# Patient Record
Sex: MALE | Race: WHITE | NOT HISPANIC OR LATINO | Employment: PART TIME | URBAN - METROPOLITAN AREA
[De-identification: names, ages, dates, MRNs, and addresses within clinical notes are randomized per-mention and may not be internally consistent; named-entity substitution may affect disease eponyms.]

---

## 2018-06-26 ENCOUNTER — RECORDS - HEALTHEAST (OUTPATIENT)
Dept: GENERAL RADIOLOGY | Facility: CLINIC | Age: 28
End: 2018-06-26

## 2018-06-26 ENCOUNTER — OFFICE VISIT - HEALTHEAST (OUTPATIENT)
Dept: FAMILY MEDICINE | Facility: CLINIC | Age: 28
End: 2018-06-26

## 2018-06-26 ENCOUNTER — COMMUNICATION - HEALTHEAST (OUTPATIENT)
Dept: FAMILY MEDICINE | Facility: CLINIC | Age: 28
End: 2018-06-26

## 2018-06-26 ENCOUNTER — COMMUNICATION - HEALTHEAST (OUTPATIENT)
Dept: SCHEDULING | Facility: CLINIC | Age: 28
End: 2018-06-26

## 2018-06-26 DIAGNOSIS — M25.562 LEFT KNEE PAIN, UNSPECIFIED CHRONICITY: ICD-10-CM

## 2018-06-26 DIAGNOSIS — Z80.0 FAMILY HISTORY OF COLON CANCER: ICD-10-CM

## 2018-06-26 DIAGNOSIS — E66.01 OBESITY, CLASS III, BMI 40-49.9 (MORBID OBESITY) (H): ICD-10-CM

## 2018-06-26 DIAGNOSIS — M25.562 PAIN IN LEFT KNEE: ICD-10-CM

## 2018-06-26 ASSESSMENT — MIFFLIN-ST. JEOR: SCORE: 2512.08

## 2021-06-01 VITALS — HEIGHT: 72 IN | WEIGHT: 315 LBS | BODY MASS INDEX: 42.66 KG/M2

## 2021-06-18 NOTE — PROGRESS NOTES
Assessment / Impression     1. Left knee pain, unspecified chronicity  XR Knee Left Plus [place] VW    Ambulatory referral to PT/OT   2. Obesity, Class III, BMI 40-49.9 (morbid obesity) (H)     3. Family history of colon cancer             Plan:     Left knee pain: I reviewed today's x-ray results with patient.  Patient does have some mild lateral joint line tenderness, otherwise exam was essentially normal.  For now, would recommend physical therapy, referral was ordered, and we did discuss treatment with ice, elevation, and NSAIDs.  Patient states that ibuprofen has not been helpful, therefore I did give him prescription strength naproxen 500 mg twice daily.  Advised patient to use medication with food.    Obesity class III: Also discussed with patient that some of his knee symptoms may be related to weight.  Briefly discussed lifestyle changes, increasing physical activity.    Follow-up in 1-2 months if symptoms persist despite treatment plan outlined, consider MRI at that time.    Family history of colon cancer: Patient most recently had colonoscopy done in 2016, biopsy showed benign hyperplastic polyps, was recommended that he repeat colonoscopy in 3-5 years, given his personal and family history.    She has also not had a general physical in several years, encourage patient to schedule physical appointment in the next month.    Subjective:      HPI: Ish Dietz is a 28 y.o. male, new to [employer], who presents for left knee pain.  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  Pain started around 2pm 2 days ago.  Doesn't recall any injury with this new onset pain, though states he fell in winter, slipped on ice about 15-16 years ago and injured left knee.  Feels a little better now, when resting, pain is better, worse when walking.  He also notes the pain is worse if he turns his leg.  There is no locking of the knee.  No fevers.   Pain is on lateral and anterior part of knee.  No knee surgeries.  Took aspirin, which didn't do much for pain.        Medical History:     Patient Active Problem List   Diagnosis     Depression     Tobacco abuse     Obesity, Class III, BMI 40-49.9 (morbid obesity) (H)     Family history of colon cancer       History reviewed. No pertinent past medical history.    Past Surgical History:   Procedure Laterality Date     WISDOM TOOTH EXTRACTION         Current Medications:     Current Outpatient Prescriptions   Medication Sig     aspirin 325 MG tablet Take 325 mg by mouth daily.     naproxen (NAPROSYN) 500 MG tablet Take 1 tablet (500 mg total) by mouth 2 (two) times a day with meals.       Family History:     Family History   Problem Relation Age of Onset     Colon cancer Sister 32     Multiple sclerosis Maternal Aunt        Review of Systems  All other systems reviewed and are negative.         Social History:     History   Smoking Status     Current Every Day Smoker     Types: Cigarettes   Smokeless Tobacco     Never Used     Social History     Social History Narrative     No narrative on file         Objective:     /78 (Patient Site: Left Arm, Patient Position: Sitting, Cuff Size: Adult Large)  Pulse 82  Resp 18  Ht 6' (1.829 m)  Wt (!) 337 lb 1.6 oz (152.9 kg)  BMI 45.72 kg/m2  Physical Examination: General appearance - alert, well appearing, and in no distress  Eyes: pupils equal and reactive, extraocular eye movements intact  Mouth: mucous membranes moist, pharynx normal without lesions  Neck: supple, no significant adenopathy or thyromegaly  Lungs: clear to auscultation, no wheezes, rales or rhonchi, symmetric air entry  Heart: normal rate, regular rhythm, normal S1, S2, no murmurs.  Abdomen: soft, nontender, nondistended, no masses or organomegaly  Neurological: alert, oriented, normal speech, no focal findings or movement disorder noted.    Musculoskeletal: normal gait.  Right lower extremity: No  swelling or skin color changes of the knee.  Full range of motion with flexion and extension of knee joint.  No joint line tenderness.  Ligaments appear intact.  Left lower extremity: There is no swelling, effusion, or skin color changes of the knee.  The knee is not warm to touch.  He does have some lateral joint line tenderness.  Negative Edwin's.  Normal anterior drawer test.  Ligaments appear intact.  Extremities: No edema, no clubbing or cyanosis  Psychiatric: Normal affect. Does not appear anxious or depressed.    Left knee x-ray: by my interpretation, no acute fracture or dislocation.      Radha Ruggiero MD  6/26/2018  1:47 PM

## 2021-08-10 ENCOUNTER — NURSE TRIAGE (OUTPATIENT)
Dept: NURSING | Facility: CLINIC | Age: 31
End: 2021-08-10

## 2021-08-10 ENCOUNTER — OFFICE VISIT (OUTPATIENT)
Dept: PEDIATRICS | Facility: CLINIC | Age: 31
End: 2021-08-10

## 2021-08-10 VITALS
DIASTOLIC BLOOD PRESSURE: 86 MMHG | WEIGHT: 315 LBS | BODY MASS INDEX: 44.76 KG/M2 | SYSTOLIC BLOOD PRESSURE: 137 MMHG | HEART RATE: 83 BPM | OXYGEN SATURATION: 95 %

## 2021-08-10 DIAGNOSIS — K62.5 RECTAL BLEEDING: Primary | ICD-10-CM

## 2021-08-10 DIAGNOSIS — E66.01 MORBID OBESITY (H): ICD-10-CM

## 2021-08-10 PROBLEM — Z80.0 FAMILY HISTORY OF COLON CANCER: Status: ACTIVE | Noted: 2018-06-26

## 2021-08-10 PROBLEM — D12.6 ADENOMATOUS POLYP OF COLON: Status: ACTIVE | Noted: 2021-01-19

## 2021-08-10 PROCEDURE — 99203 OFFICE O/P NEW LOW 30 MIN: CPT | Performed by: INTERNAL MEDICINE

## 2021-08-10 ASSESSMENT — PATIENT HEALTH QUESTIONNAIRE - PHQ9
SUM OF ALL RESPONSES TO PHQ QUESTIONS 1-9: 10
SUM OF ALL RESPONSES TO PHQ QUESTIONS 1-9: 10
10. IF YOU CHECKED OFF ANY PROBLEMS, HOW DIFFICULT HAVE THESE PROBLEMS MADE IT FOR YOU TO DO YOUR WORK, TAKE CARE OF THINGS AT HOME, OR GET ALONG WITH OTHER PEOPLE: SOMEWHAT DIFFICULT
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
## 2021-08-11 ASSESSMENT — PATIENT HEALTH QUESTIONNAIRE - PHQ9: SUM OF ALL RESPONSES TO PHQ QUESTIONS 1-9: 10

## 2021-08-23 ENCOUNTER — TELEPHONE (OUTPATIENT)
Dept: SURGERY | Facility: CLINIC | Age: 31
End: 2021-08-23

## 2021-08-23 NOTE — TELEPHONE ENCOUNTER
[facility] Call Center    Phone Message    May a detailed message be left on voicemail: yes     Reason for Call: Other: Per pt has referral for Rectal Bleeding referred by Lyla Hopkins MD. [facility] in [place] [facility] [employer] [place].      Action Taken: Message routed to:  [facility] Colon and rec    Travel Screening: Not Applicable

## 2021-10-07 ENCOUNTER — PRE VISIT (OUTPATIENT)
Dept: SURGERY | Facility: CLINIC | Age: 31
End: 2021-10-07

## 2021-10-07 NOTE — TELEPHONE ENCOUNTER
RECORDS RECEIVED FROM: Internal    Appt date: 10/27/2021   NOTES STATUS DETAILS   OFFICE NOTE from referring provider  Internal 08/10/2021 -- Lyla Hopkins MD in [facility]   OFFICE NOTE from other specialist   N/A    DISCHARGE SUMMARY from hospital  N/A    DISCHARGE REPORT from the ER N/A    OPERATIVE REPORT  N/A    MEDICATION LIST Internal    LABS     PFC TESTING N/A    ANAL PAP N/A    BIOPSIES/PATHOLOGY RELATED TO DIAGNOSIS Care Everywhere 01/08/2021 -- Colon, ascending, Sigmoid -- Tameka Gilbert MD    DIAGNOSTIC PROCEDURES     COLONOSCOPY N/A    UPPER ENDOSCOPY (EGD) N/A    FLEX SIGMOIDOSCOPY  N/A    ERCP N/A    IMAGING (DISC & REPORT)      CT  N/A    MRI N/A    XRAY N/A    ULTRASOUND (ENDOANAL/ENDORECTAL) N/A       Pt sleeping soundly.

## 2021-10-26 NOTE — PROGRESS NOTES
"Colon and Rectal Surgery Consult Clinic Note    Referring provider:  Lyla Hopkins MD  [address] [place] [address]       RE: Ish Dietz  : 1990  ZOEY: 10/27/2021      Ish Dietz is a very pleasant 31 year old male who presents today for rectal bleeding.    HPI:  Ish was seen by his PCP in August with rectal bleeding and a painful lump on his anus. He states that this has been an issue on and off for about a year. He was having sharp pain and some bright red blood with wiping after bowel movements. He also noticed some purulent drainage. He states that at one point it seemed to burst with a lot of drainage. He is now having some continued bleeding and discomfort but pain is not as severe. He does have some intermittent constipation and diarrhea.  Current smoker.  Sister had colon cancer at age 30 and maternal second cousin with colon cancer at age 30.  Paternal side of his family has brain tumors that he says are related to \"penta contamination from a treatment plant\". Colonoscopy in 2021 with recurrent polyps. He reports that his sister had genetic testing and this was negative.    PLEASE SEE NOTE BELOW FOR PHYSICAL EXAMINATION, REVIEW OF SYSTEMS, AND OTHER HISTORY.    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 We discussed management of this with fistulotomy as this appears fairly superficial. Some purulent drainage on exam today. Discussed the importance of managing constipation and advised a daily fiber supplement and laxative as needed. Asked him to notify the clinic in the meantime with any worsening symptoms. Patient's questions were answered to his stated satisfaction and he is in agreement with this plan.    20 minutes spent on the date of the encounter doing chart review, history and exam, documentation and further activities as noted above.     Mag Bradshaw MD  Colon and Rectal Surgery Staff  [facility]      -------------------------------------------------------------------------------------------------------------------          Medical history:  No past medical history on file.    Surgical history:  Past Surgical History:   Procedure Laterality Date     C EACH ADD TOOTH EXTRACTION       WISDOM TOOTH EXTRACTION         Problem list:    Patient Active Problem List    Diagnosis Date Noted     Morbid obesity (H) 08/10/2021     Priority: Medium     Adenomatous polyp of colon 01/19/2021     Priority: Medium     Family history of colon cancer 06/26/2018     Priority: Medium     Formatting of this note might be different from the original.  2016: Colonoscopy showed benign hyperplastic polyps; plan to repeat colonoscopy in 3-5 years based on extensive family history.       Chronic low back pain 03/04/2016     Priority: Medium     Tobacco use 03/04/2016     Priority: Medium     Depression 03/19/2015     Priority: Medium     Obesity 03/19/2015     Priority: Medium     Tobacco abuse 03/19/2015     Priority: Medium       Medications:  Current Outpatient Medications   Medication Sig Dispense Refill     ASPIRIN PO Take 81 mg by mouth as needed for moderate pain       DiphenhydrAMINE HCl (BENADRYL PO) Take 50 mg by mouth nightly as needed         Allergies:  No Known Allergies    Family history:  Family History   Problem Relation Age of Onset     Arthritis Paternal Grandmother      Cerebrovascular Disease Paternal Grandmother         early 60's     Melanoma Maternal Grandfather      Thyroid Disease Mother         hyperthyroid     Thyroid Disease Maternal Grandmother         hyperthyroid     Colon Cancer Sister 32.00     Multiple Sclerosis Maternal Aunt        Social history:  Social History     Socioeconomic History     Marital status: Single     Spouse name: Not on file     Number of children: Not on file     Years of education: Not on file     Highest education level: Not on file   Occupational  History     Not on file   Tobacco Use     Smoking status: Current Every Day Smoker     Packs/day: 1.50     Years: 10.00     Pack years: 15.00     Types: Cigarettes, Cigarettes     Smokeless tobacco: Never Used   Substance and Sexual Activity     Alcohol use: Yes     Comment: Alcoholic Drinks/day: binge drink Friday nights 6-10drinks/week     Drug use: Yes     Types: Marijuana     Comment: Drug use: daily use     Sexual activity: Yes     Partners: Female     Birth control/protection: Condom   Other Topics Concern     Not on file   Social History Narrative    Works at [employer] [place]     Social Determinants of Health     Financial Resource Strain:      Difficulty of Paying Living Expenses:    Food Insecurity:      Worried About Running Out of Food in the Last Year:      Ran Out of Food in the Last Year:    Transportation Needs:      Lack of Transportation (Medical):      Lack of Transportation (Non-Medical):    Physical Activity:      Days of Exercise per Week:      Minutes of Exercise per Session:    Stress:      Feeling of Stress :    Social Connections:      Frequency of Communication with Friends and Family:      Frequency of Social Gatherings with Friends and Family:      Attends [employer] Services:      Active Member of Clubs or Organizations:      Attends Club or Organization Meetings:      Marital Status:    Intimate Partner Violence:      Fear of Current or Ex-Partner:      Emotionally Abused:      Physically Abused:      Sexually Abused:          Nursing Notes:   Zuleyka Garcia  10/27/2021  4:19 PM  Signed  Chief Complaint   Patient presents with     Rectal Bleeding       Vitals:    10/27/21 1617   BP: (!) 147/94   BP Location: Left arm   Patient Position: Sitting   Cuff Size: Adult Large   Pulse: 96   SpO2: 95%   Weight: 325 lb 6.4 oz   Height: 6'       Body mass index is 44.13 kg/m .    Zuleyka Garcia CMA         Physical Examination:  BP (!) 147/94 (BP Location: Left arm, Patient Position:  Sitting, Cuff Size: Adult Large)   Pulse 96   Ht 6'   Wt 325 lb 6.4 oz   SpO2 95%   BMI 44.13 kg/m    General: alert, oriented, in no acute distress, sitting comfortalby  HEENT: mucous membranes moist  Perianal external examination:  Small ulceration in the posterior midline just inside the anal verge and a small external fistula opening just outside the anal verge in the posterior position with a drop of purulent drainage present.  Digital rectal examination: Was deferred.    Anoscopy: Was deferred.

## 2021-10-27 ENCOUNTER — OFFICE VISIT (OUTPATIENT)
Dept: SURGERY | Facility: CLINIC | Age: 31
End: 2021-10-27
Attending: INTERNAL MEDICINE
Payer: COMMERCIAL

## 2021-10-27 VITALS
OXYGEN SATURATION: 95 % | SYSTOLIC BLOOD PRESSURE: 147 MMHG | DIASTOLIC BLOOD PRESSURE: 94 MMHG | WEIGHT: 315 LBS | HEART RATE: 96 BPM | BODY MASS INDEX: 42.66 KG/M2 | HEIGHT: 72 IN

## 2021-10-27 DIAGNOSIS — K62.5 RECTAL BLEEDING: ICD-10-CM

## 2021-10-27 DIAGNOSIS — K60.30 ANAL FISTULA: Primary | ICD-10-CM

## 2021-10-27 PROCEDURE — 99202 OFFICE O/P NEW SF 15 MIN: CPT | Performed by: COLON & RECTAL SURGERY

## 2021-10-27 ASSESSMENT — MIFFLIN-ST. JEOR: SCORE: 2459

## 2021-10-27 ASSESSMENT — PAIN SCALES - GENERAL: PAINLEVEL: NO PAIN (0)

## 2021-10-27 NOTE — NURSING NOTE
Chief Complaint   Patient presents with     Rectal Bleeding       Vitals:    10/27/21 1617   BP: (!) 147/94   BP Location: Left arm   Patient Position: Sitting   Cuff Size: Adult Large   Pulse: 96   SpO2: 95%   Weight: 325 lb 6.4 oz   Height: 6'       Body mass index is 44.13 kg/m .    Zuleyka Garcia CMA

## 2021-10-27 NOTE — PATIENT INSTRUCTIONS
Follow up:    1. Please call Samantha to set up your surgery     2. Appointments you will need:   -pre op physical   -COVID test     3. You will need to do 2 fleet enemas for your prep. We will mail you the instructions on how to do this.     RAVI Saunders [phone]    Clinic Fax Number [phone]    Surgery Scheduling [phone]    My Chart is available 24 hours a day and is a secure way to access your records and communicate with your care team.  I strongly recommend signing up if you haven't already done so, if you are comfortable with computers.  If you would like to inquire about this or are having problems with My Chart access, you may call [phone] or go online at [email or web address].  Please allow at least 24 hours for a response and extra time on weekends and Holidays.

## 2021-10-27 NOTE — LETTER
"10/27/2021       RE: Ish Dietz  [address] [place] [address]     Dear Colleague,    Thank you for referring your patient, Ish Dietz, to the [facility] COLON AND RECTAL SURGERY CLINIC [place] [facility]. Please see a copy of my visit note below.    Colon and Rectal Surgery Consult Clinic Note    Referring provider:  Lyla Hopkins MD  [address] [place] [address]       RE: Ish Dietz  : 1990  ZOEY: 10/27/2021      Ish Dietz is a very pleasant 31 year old male who presents today for rectal bleeding.    HPI:  Ish was seen by his PCP in August with rectal bleeding and a painful lump on his anus. He states that this has been an issue on and off for about a year. He was having sharp pain and some bright red blood with wiping after bowel movements. He also noticed some purulent drainage. He states that at one point it seemed to burst with a lot of drainage. He is now having some continued bleeding and discomfort but pain is not as severe. He does have some intermittent constipation and diarrhea.  Current smoker.  Sister had colon cancer at age 30 and maternal second cousin with colon cancer at age 30.  Paternal side of his family has brain tumors that he says are related to \"penta contamination from a treatment plant\". Colonoscopy in 2021 with recurrent polyps. He reports that his sister had genetic testing and this was negative.    PLEASE SEE NOTE BELOW FOR PHYSICAL EXAMINATION, REVIEW OF SYSTEMS, AND OTHER HISTORY.    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 We discussed management of this with fistulotomy as this appears fairly superficial. Some purulent drainage on exam today. Discussed the importance of managing constipation and advised a daily fiber supplement and laxative as needed. Asked him to notify the clinic in the meantime with any worsening " symptoms. Patient's questions were answered to his stated satisfaction and he is in agreement with this plan.    20 minutes spent on the date of the encounter doing chart review, history and exam, documentation and further activities as noted above.     Mag Bradshaw MD  Colon and Rectal Surgery Staff  [facility]      -------------------------------------------------------------------------------------------------------------------          Medical history:  No past medical history on file.    Surgical history:  Past Surgical History:   Procedure Laterality Date     C EACH ADD TOOTH EXTRACTION       WISDOM TOOTH EXTRACTION         Problem list:    Patient Active Problem List    Diagnosis Date Noted     Morbid obesity (H) 08/10/2021     Priority: Medium     Adenomatous polyp of colon 01/19/2021     Priority: Medium     Family history of colon cancer 06/26/2018     Priority: Medium     Formatting of this note might be different from the original.  2016: Colonoscopy showed benign hyperplastic polyps; plan to repeat colonoscopy in 3-5 years based on extensive family history.       Chronic low back pain 03/04/2016     Priority: Medium     Tobacco use 03/04/2016     Priority: Medium     Depression 03/19/2015     Priority: Medium     Obesity 03/19/2015     Priority: Medium     Tobacco abuse 03/19/2015     Priority: Medium       Medications:  Current Outpatient Medications   Medication Sig Dispense Refill     ASPIRIN PO Take 81 mg by mouth as needed for moderate pain       DiphenhydrAMINE HCl (BENADRYL PO) Take 50 mg by mouth nightly as needed         Allergies:  No Known Allergies    Family history:  Family History   Problem Relation Age of Onset     Arthritis Paternal Grandmother      Cerebrovascular Disease Paternal Grandmother         early 60's     Melanoma Maternal Grandfather      Thyroid Disease Mother         hyperthyroid     Thyroid Disease Maternal Grandmother          hyperthyroid     Colon Cancer Sister 32.00     Multiple Sclerosis Maternal Aunt        Social history:  Social History     Socioeconomic History     Marital status: Single     Spouse name: Not on file     Number of children: Not on file     Years of education: Not on file     Highest education level: Not on file   Occupational History     Not on file   Tobacco Use     Smoking status: Current Every Day Smoker     Packs/day: 1.50     Years: 10.00     Pack years: 15.00     Types: Cigarettes, Cigarettes     Smokeless tobacco: Never Used   Substance and Sexual Activity     Alcohol use: Yes     Comment: Alcoholic Drinks/day: binge drink Friday nights 6-10drinks/week     Drug use: Yes     Types: Marijuana     Comment: Drug use: daily use     Sexual activity: Yes     Partners: Female     Birth control/protection: Condom   Other Topics Concern     Not on file   Social History Narrative    Works at "[employer] center     Social Determinants of Health     Financial Resource Strain:      Difficulty of Paying Living Expenses:    Food Insecurity:      Worried About Running Out of Food in the Last Year:      Ran Out of Food in the Last Year:    Transportation Needs:      Lack of Transportation (Medical):      Lack of Transportation (Non-Medical):    Physical Activity:      Days of Exercise per Week:      Minutes of Exercise per Session:    Stress:      Feeling of Stress :    Social Connections:      Frequency of Communication with Friends and Family:      Frequency of Social Gatherings with Friends and Family:      Attends Jain Services:      Active Member of Clubs or Organizations:      Attends Club or Organization Meetings:      Marital Status:    Intimate Partner Violence:      Fear of Current or Ex-Partner:      Emotionally Abused:      Physically Abused:      Sexually Abused:          Nursing Notes:   Zuleyka Garcia  10/27/2021  4:19 PM  Signed  Chief Complaint   Patient presents with     Rectal Bleeding        Vitals:    10/27/21 1617   BP: (!) 147/94   BP Location: Left arm   Patient Position: Sitting   Cuff Size: Adult Large   Pulse: 96   SpO2: 95%   Weight: 325 lb 6.4 oz   Height: 6'       Body mass index is 44.13 kg/m .    Zuleyka Garcia CMA         Physical Examination:  BP (!) 147/94 (BP Location: Left arm, Patient Position: Sitting, Cuff Size: Adult Large)   Pulse 96   Ht 1.829 m (6')   Wt 147.6 kg (325 lb 6.4 oz)   SpO2 95%   BMI 44.13 kg/m    General: alert, oriented, in no acute distress, sitting comfortalby  HEENT: mucous membranes moist  Perianal external examination:  Small ulceration in the posterior midline just inside the anal verge and a small external fistula opening just outside the anal verge in the posterior position with a drop of purulent drainage present.  Digital rectal examination: Was deferred.    Anoscopy: Was deferred.              Again, thank you for allowing me to participate in the care of your patient.      Sincerely,    Mag Bradshaw MD

## 2021-10-28 ENCOUNTER — TELEPHONE (OUTPATIENT)
Dept: SURGERY | Facility: CLINIC | Age: 31
End: 2021-10-28

## 2021-10-28 NOTE — TELEPHONE ENCOUNTER
Tier 2 Case Request received to schedule:    Patient Name: Ish Dietz   MRN: [id number]   Case#: [id number]   Surgeon(s) and Role:      * Mag Bradshaw MD - Primary   Date requested: * No dates entered *   Location: [facility] OR   Procedure(s):   EXAM UNDER ANESTHESIA, ANUS, possible fistulotomy, possible seton placement (N/A)   FISTULOTOMY, RECTUM (N/A)   PLACEMENT, SETON STITCH (N/A)     Additional Instructions for the Case  Multi-surgeon case:  no  Time in minutes:  45  Anesthesia: MAC  Prep: 2 fleets  Pre-Op: PAC  Labs: no  WOC: no  Special equipment: no  Special Instructions: no    Schedule with Stephanie Lagos NP 1-2 weeks after surgery.  Schedule with Surgeon 4-5 weeks after Stephanie Lagos NP    Patient is scheduled for surgery with Dr. Mag Bradshaw    Spoke with: Ish    Date of Surgery: 12/3/2021    Location: [facility]    Informed patient they will need an adult  Yes    Pre op with Provider Dr. Bradshaw on 10/27/2021    H&P: Scheduled with PAC on 11/29 at 11:45 AM    Pre-procedure COVID-19 Test: 11/29/2021 at 1:00 PM    Post-operative appointments:    - Stephanie Lagos NP: 12/10/2021 at 11:00 AM    - Dr. Bradshaw: TBD once 2022 clinic schedule is released    Surgery packet: will mail    Samantha Albarran  Cindi-op Coordinator  [place]  Direct Phone: [phone]

## 2021-10-29 ENCOUNTER — TELEPHONE (OUTPATIENT)
Dept: SURGERY | Facility: CLINIC | Age: 31
End: 2021-10-29

## 2021-10-29 NOTE — TELEPHONE ENCOUNTER
FUTURE VISIT INFORMATION      SURGERY INFORMATION:    Date: 12/3/2021    Location:[facility] OR    Surgeon:  Mag Bradshaw MD    Anesthesia Type:  Monitor Anesthesia Care    Procedure: EXAM UNDER ANESTHESIA, ANUS, possible fistulotomy, possible seton placement    Consult: 10/27/2021    RECORDS REQUESTED FROM:       Primary Care Provider: Rolando Barker MD  ([facility])

## 2021-10-29 NOTE — TELEPHONE ENCOUNTER
Correction to previous note: Case is to be scheduled at [place], so it is wait-listed to be added.    Tier 2 Case Request received to schedule:     Patient Name: Ish Dietz   MRN: [id number]   Case#: [id number]   Surgeon(s) and Role:      * Mga Bradshaw MD - Primary   Date requested: * No dates entered *   Location: [facility] OR   Procedure(s):   EXAM UNDER ANESTHESIA, ANUS, possible fistulotomy, possible seton placement (N/A)   FISTULOTOMY, RECTUM (N/A)   PLACEMENT, SETON STITCH (N/A)      Additional Instructions for the Case  Multi-surgeon case:  no  Time in minutes:  45  Anesthesia: MAC  Prep: 2 fleets  Pre-Op: PAC  Labs: no  WOC: no  Special equipment: no  Special Instructions: no     Schedule with Stephanie Lagos NP 1-2 weeks after surgery.  Schedule with Surgeon 4-5 weeks after Stephanie Lagos NP     Patient is on the wait list to be scheduled for surgery with Dr. Mag Bradshaw     Spoke with: Ish Patelative Date of Surgery: 12/3/2021     Location: [place] OR     Informed patient they will need an adult  Yes     Pre op with Provider Dr. Bradshaw on 10/27/2021     H&P: Scheduled with PAC on 11/29 at 11:45 AM     Pre-procedure COVID-19 Test: 11/29/2021 at 1:00 PM     Post-operative appointments:     - Stephanie Lagos NP: 12/10/2021 at 11:00 AM     - Dr. Bradshaw: TBD once 2022 clinic schedule is released     Surgery packet: will mail     Samantha Albarran  Cindi-op Coordinator  [place]  Direct Phone: [phone]

## 2021-11-06 NOTE — PROGRESS NOTES
"Colon and Rectal Surgery Consult Clinic Note    Referring provider:  Lyla Hopkins MD  [address] [place] [address]       RE: Ish Dietz  : 1990  ZOEY: 10/27/2021      Ish Dietz is a very pleasant 31 year old male who presents today for rectal bleeding.    HPI:  Ish was seen by his PCP in August with rectal bleeding and a painful lump on his anus. He states that this has been an issue on and off for about a year. He was having sharp pain and some bright red blood with wiping after bowel movements. He also noticed some purulent drainage. He states that at one point it seemed to burst with a lot of drainage. He is now having some continued bleeding and discomfort but pain is not as severe. He does have some intermittent constipation and diarrhea.  Current smoker.  Sister had colon cancer at age 30 and maternal second cousin with colon cancer at age 30.  Paternal side of his family has brain tumors that he says are related to \"penta contamination from a treatment plant\". Colonoscopy in 2021 with recurrent polyps. He reports that his sister had genetic testing and this was negative.    PLEASE SEE NOTE BELOW FOR PHYSICAL EXAMINATION, REVIEW OF SYSTEMS, AND OTHER HISTORY.    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 We discussed management of this with fistulotomy as this appears fairly superficial. Some purulent drainage on exam today. Discussed the importance of managing constipation and advised a daily fiber supplement and laxative as needed. Asked him to notify the clinic in the meantime with any worsening symptoms. Patient's questions were answered to his stated satisfaction and he is in agreement with this plan.    20 minutes spent on the date of the encounter doing chart review, history and exam, documentation and further activities as noted above.     Mag Bradshaw MD  Colon and Rectal Surgery Staff  [facility]" [facility]      -------------------------------------------------------------------------------------------------------------------          Medical history:  No past medical history on file.    Surgical history:  Past Surgical History:   Procedure Laterality Date     C EACH ADD TOOTH EXTRACTION       WISDOM TOOTH EXTRACTION         Problem list:    Patient Active Problem List    Diagnosis Date Noted     Morbid obesity (H) 08/10/2021     Priority: Medium     Adenomatous polyp of colon 01/19/2021     Priority: Medium     Family history of colon cancer 06/26/2018     Priority: Medium     Formatting of this note might be different from the original.  2016: Colonoscopy showed benign hyperplastic polyps; plan to repeat colonoscopy in 3-5 years based on extensive family history.       Chronic low back pain 03/04/2016     Priority: Medium     Tobacco use 03/04/2016     Priority: Medium     Depression 03/19/2015     Priority: Medium     Obesity 03/19/2015     Priority: Medium     Tobacco abuse 03/19/2015     Priority: Medium       Medications:  Current Outpatient Medications   Medication Sig Dispense Refill     ASPIRIN PO Take 81 mg by mouth as needed for moderate pain       DiphenhydrAMINE HCl (BENADRYL PO) Take 50 mg by mouth nightly as needed         Allergies:  No Known Allergies    Family history:  Family History   Problem Relation Age of Onset     Arthritis Paternal Grandmother      Cerebrovascular Disease Paternal Grandmother         early 60's     Melanoma Maternal Grandfather      Thyroid Disease Mother         hyperthyroid     Thyroid Disease Maternal Grandmother         hyperthyroid     Colon Cancer Sister 32.00     Multiple Sclerosis Maternal Aunt        Social history:  Social History     Socioeconomic History     Marital status: Single     Spouse name: Not on file     Number of children: Not on file     Years of education: Not on file     Highest education level: Not on file   Occupational  History     Not on file   Tobacco Use     Smoking status: Current Every Day Smoker     Packs/day: 1.50     Years: 10.00     Pack years: 15.00     Types: Cigarettes, Cigarettes     Smokeless tobacco: Never Used   Substance and Sexual Activity     Alcohol use: Yes     Comment: Alcoholic Drinks/day: binge drink Friday nights 6-10drinks/week     Drug use: Yes     Types: Marijuana     Comment: Drug use: daily use     Sexual activity: Yes     Partners: Female     Birth control/protection: Condom   Other Topics Concern     Not on file   Social History Narrative    Works at [employer]     Social Determinants of Health     Financial Resource Strain:      Difficulty of Paying Living Expenses:    Food Insecurity:      Worried About Running Out of Food in the Last Year:      Ran Out of Food in the Last Year:    Transportation Needs:      Lack of Transportation (Medical):      Lack of Transportation (Non-Medical):    Physical Activity:      Days of Exercise per Week:      Minutes of Exercise per Session:    Stress:      Feeling of Stress :    Social Connections:      Frequency of Communication with Friends and Family:      Frequency of Social Gatherings with Friends and Family:      Attends Pentecostal Services:      Active Member of Clubs or Organizations:      Attends Club or Organization Meetings:      Marital Status:    Intimate Partner Violence:      Fear of Current or Ex-Partner:      Emotionally Abused:      Physically Abused:      Sexually Abused:          Nursing Notes:   Zuleyka Garcia  10/27/2021  4:19 PM  Signed  Chief Complaint   Patient presents with     Rectal Bleeding       Vitals:    10/27/21 1617   BP: (!) 147/94   BP Location: Left arm   Patient Position: Sitting   Cuff Size: Adult Large   Pulse: 96   SpO2: 95%   Weight: 325 lb 6.4 oz   Height: 6'       Body mass index is 44.13 kg/m .    Zuleyka Garcia CMA         Physical Examination:  BP (!) 147/94 (BP Location: Left arm, Patient Position:  Sitting, Cuff Size: Adult Large)   Pulse 96   Ht 1.829 m (6')   Wt 147.6 kg (325 lb 6.4 oz)   SpO2 95%   BMI 44.13 kg/m    General: alert, oriented, in no acute distress, sitting comfortalby  HEENT: mucous membranes moist  Perianal external examination:  Small ulceration in the posterior midline just inside the anal verge and a small external fistula opening just outside the anal verge in the posterior position with a drop of purulent drainage present.  Digital rectal examination: Was deferred.    Anoscopy: Was deferred.

## 2021-11-23 DIAGNOSIS — Z11.59 ENCOUNTER FOR SCREENING FOR OTHER VIRAL DISEASES: ICD-10-CM

## 2021-11-29 ENCOUNTER — OFFICE VISIT (OUTPATIENT)
Dept: SURGERY | Facility: CLINIC | Age: 31
End: 2021-11-29
Payer: COMMERCIAL

## 2021-11-29 ENCOUNTER — TELEPHONE (OUTPATIENT)
Dept: SURGERY | Facility: CLINIC | Age: 31
End: 2021-11-29

## 2021-11-29 ENCOUNTER — LAB (OUTPATIENT)
Dept: LAB | Facility: CLINIC | Age: 31
End: 2021-11-29
Attending: COLON & RECTAL SURGERY

## 2021-11-29 ENCOUNTER — PRE VISIT (OUTPATIENT)
Dept: SURGERY | Facility: CLINIC | Age: 31
End: 2021-11-29

## 2021-11-29 ENCOUNTER — ANESTHESIA EVENT (OUTPATIENT)
Dept: SURGERY | Facility: CLINIC | Age: 31
End: 2021-11-29
Payer: COMMERCIAL

## 2021-11-29 ENCOUNTER — PATIENT OUTREACH (OUTPATIENT)
Dept: SURGERY | Facility: CLINIC | Age: 31
End: 2021-11-29

## 2021-11-29 ENCOUNTER — LAB (OUTPATIENT)
Dept: LAB | Facility: CLINIC | Age: 31
End: 2021-11-29
Payer: COMMERCIAL

## 2021-11-29 VITALS
BODY MASS INDEX: 42.66 KG/M2 | SYSTOLIC BLOOD PRESSURE: 141 MMHG | RESPIRATION RATE: 20 BRPM | HEIGHT: 72 IN | OXYGEN SATURATION: 96 % | HEART RATE: 88 BPM | DIASTOLIC BLOOD PRESSURE: 85 MMHG | TEMPERATURE: 97.5 F | WEIGHT: 315 LBS

## 2021-11-29 DIAGNOSIS — K60.30 ANAL FISTULA: Primary | ICD-10-CM

## 2021-11-29 DIAGNOSIS — Z01.818 PRE-OP EVALUATION: Primary | ICD-10-CM

## 2021-11-29 DIAGNOSIS — Z11.59 ENCOUNTER FOR SCREENING FOR OTHER VIRAL DISEASES: ICD-10-CM

## 2021-11-29 DIAGNOSIS — Z01.818 PRE-OP EVALUATION: ICD-10-CM

## 2021-11-29 LAB
CREAT SERPL-MCNC: 0.7 MG/DL (ref 0.66–1.25)
ERYTHROCYTE [DISTWIDTH] IN BLOOD BY AUTOMATED COUNT: 13.2 % (ref 10–15)
GFR SERPL CREATININE-BSD FRML MDRD: >90 ML/MIN/1.73M2
HCT VFR BLD AUTO: 48.2 % (ref 40–53)
HGB BLD-MCNC: 15.9 G/DL (ref 13.3–17.7)
MCH RBC QN AUTO: 28.2 PG (ref 26.5–33)
MCHC RBC AUTO-ENTMCNC: 33 G/DL (ref 31.5–36.5)
MCV RBC AUTO: 86 FL (ref 78–100)
PLATELET # BLD AUTO: 234 10E3/UL (ref 150–450)
POTASSIUM BLD-SCNC: 4.2 MMOL/L (ref 3.4–5.3)
RBC # BLD AUTO: 5.64 10E6/UL (ref 4.4–5.9)
WBC # BLD AUTO: 8.9 10E3/UL (ref 4–11)

## 2021-11-29 PROCEDURE — 36415 COLL VENOUS BLD VENIPUNCTURE: CPT | Performed by: PATHOLOGY

## 2021-11-29 PROCEDURE — 85027 COMPLETE CBC AUTOMATED: CPT | Performed by: PATHOLOGY

## 2021-11-29 PROCEDURE — 84132 ASSAY OF SERUM POTASSIUM: CPT | Performed by: PATHOLOGY

## 2021-11-29 PROCEDURE — 82565 ASSAY OF CREATININE: CPT | Performed by: PATHOLOGY

## 2021-11-29 PROCEDURE — 99203 OFFICE O/P NEW LOW 30 MIN: CPT | Performed by: PHYSICIAN ASSISTANT

## 2021-11-29 PROCEDURE — U0005 INFEC AGEN DETEC AMPLI PROBE: HCPCS | Performed by: COLON & RECTAL SURGERY

## 2021-11-29 RX ORDER — FLUTICASONE PROPIONATE 50 MCG
1 SPRAY, SUSPENSION (ML) NASAL
COMMUNITY

## 2021-11-29 ASSESSMENT — ENCOUNTER SYMPTOMS: SEIZURES: 1

## 2021-11-29 ASSESSMENT — MIFFLIN-ST. JEOR: SCORE: 2491.21

## 2021-11-29 ASSESSMENT — PAIN SCALES - GENERAL: PAINLEVEL: MODERATE PAIN (5)

## 2021-11-29 ASSESSMENT — LIFESTYLE VARIABLES: TOBACCO_USE: 1

## 2021-11-29 NOTE — H&P
Pre-Operative H & P     CC:  Preoperative exam to assess for increased cardiopulmonary risk while undergoing surgery and anesthesia.    Date of Encounter: 11/29/2021  Primary Care Physician:  No Ref-Primary, Physician     Reason for visit:   Encounter Diagnosis   Name Primary?     Pre-op evaluation Yes       HPI  Ish Dietz is a 31 year old male who presents for pre-operative H & P in preparation for EUA anus, possible fistulotomy rectum, possible seton placement with Dr. Bradshaw on 12/3/21 at [facility]. Patient is being evaluated for comorbid conditions of Current tobacco use, obesity, depression, chronic back pain      Mr. Dietz has a history of rectal bleeding with a painful lump on his anus. He states he has been noticing symptoms on and off for the past year. He was having sharp pain and BRBPR with wiping after bowel movements. He also noticed some purulent drainage. He was seen by Dr. Jimmie Roy for further evaluation and is now scheduled for the above procedure.      History is obtained from the patient and chart review    Hx of abnormal bleeding or anti-platelet use: ASA 81, has been holding for >1 week    Menstrual history: No LMP for male patient.    Prior to Admission Medications  Current Outpatient Medications   Medication Sig Dispense Refill     ASPIRIN PO Take 81 mg by mouth as needed for moderate pain       fluticasone (FLONASE) 50 MCG/ACT nasal spray Spray 1 spray into both nostrils nightly as needed for rhinitis or allergies       DiphenhydrAMINE HCl (BENADRYL PO) Take 50 mg by mouth nightly as needed (Patient not taking: Reported on 11/29/2021)         Family History  Family History   Problem Relation Age of Onset     Thyroid Disease Mother         hyperthyroid     Colon Cancer Sister 32     Thyroid Disease Maternal Grandmother         hyperthyroid     Melanoma Maternal Grandfather      Arthritis Paternal Grandmother      Cerebrovascular  Disease Paternal Grandmother         early 60's     Multiple Sclerosis Maternal Aunt      Anesthesia Reaction No family hx of      Deep Vein Thrombosis (DVT) No family hx of        The complete review of systems is negative other than noted in the HPI or here.       Anesthesia Pre-Procedure Evaluation    Patient: Ish Dietz   MRN: [id number] : 1990        Preoperative Diagnosis: Anal fistula [K60.3]    Procedure : Procedure(s):  EXAM UNDER ANESTHESIA, ANUS  possible FISTULOTOMY, RECTUM  possible PLACEMENT, SETON STITCH  PAC EVALUATION       No past medical history on file.   Past Surgical History:   Procedure Laterality Date     C EACH ADD TOOTH EXTRACTION       WISDOM TOOTH EXTRACTION        No Known Allergies   Social History     Tobacco Use     Smoking status: Current Every Day Smoker     Packs/day: 1.50     Years: 10.00     Pack years: 15.00     Types: Cigarettes, Cigarettes     Smokeless tobacco: Never Used   Substance Use Topics     Alcohol use: Yes     Comment: Alcoholic Drinks/day: binge drink Friday nights 6-10drinks/week      Wt Readings from Last 1 Encounters:   10/27/21 147.6 kg (325 lb 6.4 oz)        Anesthesia Evaluation            ROS/MED HX  ENT/Pulmonary:     (+) tobacco use, Current use,     Neurologic:  - neg neurologic ROS     Cardiovascular:     (+) -----Taking blood thinners     METS/Exercise Tolerance:     Hematologic:  - neg hematologic  ROS  (-) history of blood transfusion   Musculoskeletal: Comment: Chronic back pain        GI/Hepatic:  - neg GI/hepatic ROS  (-) GERD   Renal/Genitourinary:  - neg Renal ROS     Endo:     (+) Obesity (BMI 44),     Psychiatric/Substance Use:     (+) psychiatric history depression     Infectious Disease:  - neg infectious disease ROS     Malignancy:  - neg malignancy ROS     Other:               OUTSIDE LABS:  CBC:   Lab Results   Component Value Date    WBC 9.8 2015    HGB 16.0 2015    HCT 46.6 2015     2015     BMP:    Lab Results   Component Value Date     02/27/2015    POTASSIUM 3.6 02/27/2015    CHLORIDE 105 02/27/2015    CO2 26 02/27/2015    BUN 12 02/27/2015    CR 0.70 02/27/2015    GLC 87 02/27/2015     COAGS: No results found for: PTT, INR, FIBR  POC: No results found for: BGM, HCG, HCGS  HEPATIC:   Lab Results   Component Value Date    ALBUMIN 3.9 02/27/2015    PROTTOTAL 8.1 02/27/2015    ALT 63 02/27/2015    AST 33 02/27/2015    ALKPHOS 110 02/27/2015    BILITOTAL 0.4 02/27/2015     OTHER:   Lab Results   Component Value Date    SANDRA 9.4 02/27/2015    TSH 1.81 03/19/2015          BP (!) 141/85 (BP Location: Right arm, Patient Position: Chair, Cuff Size: Adult Large)   Pulse 88   Temp 97.5  F (36.4  C) (Oral)   Resp 20   Ht 1.829 m (6')   Wt (!) 150.8 kg (332 lb 8 oz)   SpO2 96%   BMI 45.10 kg/m           Physical Exam  Constitutional: Awake, alert, cooperative, no apparent distress, and appears stated age.  Eyes: Pupils equal, round and reactive to light, extra ocular muscles intact, sclera clear, conjunctiva normal.  HENT: Normocephalic, oral pharynx with moist mucus membranes, good dentition.   Respiratory: Clear to auscultation bilaterally, no crackles or wheezing.  Cardiovascular: Regular rate and rhythm, normal S1 and S2, and no murmur noted.  Carotids no bruits. No edema. Palpable pulses to radial arteries.   GI: Normal bowel sounds, soft, non-distended, non-tender, obese  Genitourinary: deferred  Skin: Warm and dry.    Musculoskeletal: Full ROM of neck. There is no redness, warmth, or swelling of the exposed joints. Gross motor strength is normal.    Neurologic: Awake, alert, oriented to name, place and time. Cranial nerves II-XII are grossly intact. Gait is normal.   Neuropsychiatric: Calm, cooperative. Normal affect.     PRIOR LABS/DIAGNOSTIC STUDIES:   All labs and imaging personally reviewed     EKG/ stress test - if available please see in ROS above   No results found.  No flowsheet data  found.      The patient's records and results personally reviewed by this provider.     Outside records reviewed from: care everywhere    ASSESSMENT and PLAN    Ish Dietz is a 31 year old male scheduled for EUA anus, possible fistulotomy rectum, possible seton placement on 12/3/21 by Dr. Bradshaw in treatment of anal fistula.  PAC referral for risk assessment and optimization for anesthesia with comorbid conditions of Current tobacco use, obesity, depression, chronic back pain:      Pre-operative considerations:   1.  Cardiac:  Functional status- METS 3. Denies CP, orthopnea, edema. Endorses some longstanding MEZA when ascending stairs, but not with walking distances. Low risk surgery with 0.4% (RCRI #) risk of major adverse cardiac event.   ~teri reports h/o palpitations a few years ago- per chart review, he was seen in the ED and EKG, labs normal. Patient denies any recurrent symptoms since that time     2.  Pulm:  Airway feasible.  PRINCE risk: high  ~current tobacco use. Encouraged cessation and educated not to smoke DOS. Patient also smokes marijuana and reports he will not use within 24 hours of surgery.      3.  GI:  Risk of PONV score = 1.  If > 2, anti-emetic intervention recommended.   ~anal fistula with the above procedure planned      4. Endo: BMI 44      5. psych: depression     6. msk: chronic back pain     7. Anesthesia: patient reports an episode of being slow to wake after wisdom tooth extraction. Denies any issue with anesthesia with colonoscopy last year.     8. Access: patient reports h/o difficult IV access      VTE risk: 1.8%     Patient is optimized and is acceptable candidate for the proposed procedure.  No further diagnostic evaluation is needed.      Final plan per anesthesiologist on day of surgery.     Arrival time, NPO, shower and medication instructions provided by nursing staff today.      On the day of service:     Prep time: 8 minutes  Visit time: 24 minutes  Documentation time:  8 minutes  ------------------------------------------  Total time: 40 minutes      Tara Adam PA-C  Preoperative Assessment Center  [facility]  Clinic and Surgery Center  Phone: [phone]  Fax: [phone]

## 2021-11-29 NOTE — PATIENT INSTRUCTIONS
Preparing for Your Surgery      Name:  Ish Dietz   MRN:  [id number]   :  1990   Today's Date:  2021       Arriving for surgery:  Surgery date:  12/3/21  Arrival time:  09:00 a.m.    Restrictions due to COVID 19:       One visitor is allowed in the Pre Op area.       When you go into surgery, one visitor is allowed to wait in the Surgery Waiting Room       (provided there is enough space to social distance).         In hospital patients are allowed 1 visitor per day       The visitor may change daily     Visiting Hours: 8 am - 8:30 pm   No ill visitors.   All visitors must wear face mask.    [facility] parking is available for anyone with mobility limitations or disabilities.  ([place]  24 hours/ 7 days a week; [facility]  7 am- 3:30 pm, Mon- Fri)    Please come to:     [facility] [address]  [address] [place] [address]    - ? parking is available in front of the hospital      -    Please proceed to [address] on the 3rd floor. [phone]?     - ?If you are in need of directions, wheelchair or escort please stop at the Information Desk in the lobby.  Inform the information person that you are here for surgery; a wheelchair and escort to [address] will be provided.?     What can I eat or drink?  -  You may eat and drink normally for up to 8 hours before your surgery. (Until midnight the night before your surgery)  -  You may have clear liquids until 2 hours before surgery. (Until 09:00 a.m.)    Examples of clear liquids:  Water  Clear broth  Juices (apple, white grape, white cranberry  and cider) without pulp  Noncarbonated, powder based beverages  (lemonade and Fredy-Aid)  Sodas (Sprite, 7-Up, ginger ale and seltzer)  Coffee or tea (without milk or cream)  Gatorade    -  No Alcohol for at least 24 hours before surgery     Which medicines can I take?    Hold Aspirin for 7 days before surgery.   Hold Multivitamins for 7 days before  surgery.  Hold Supplements for 7 days before surgery.  Hold Ibuprofen (Advil, Motrin) for 1 day before surgery--unless otherwise directed by surgeon.  Hold Naproxen (Aleve) for 4 days before surgery.      How do I prepare myself?  - Please take 2 showers before surgery using Scrubcare or Hibiclens soap.    Use this soap only from the neck to your toes.     Leave the soap on your skin for one minute--then rinse thoroughly.      You may use your own shampoo and conditioner; no other hair products.   - Please remove all jewelry and body piercings.  - No lotions, deodorants or fragrance.  - No makeup or fingernail polish.   - Bring your ID and insurance card.    -If you have a Deep Brain Stimulator, Spinal Cord Stimulator or any neuro stimulator device---you must bring the remote control to the hospital     - All patients are required to have a Covid-19 test within 4 days of surgery/procedure.      -Patients will be contacted by the [facility] scheduling team within 1 week of surgery to make an appointment.      - Patients may call the Scheduling team at [phone] if they have not been scheduled within 4 days of  surgery.      Questions or Concerns:    - For any questions regarding the day of surgery or your hospital stay, please contact the Pre Admission Nursing Office at [phone].       - If you have health changes between today and your surgery please call your surgeon.       For questions after surgery please call your surgeons office.

## 2021-11-29 NOTE — PROGRESS NOTES
I called patient in regards to his concern with the Fleet enema prep.  He stated his external opening of his fistula has gotten bigger and he is concerned that the Fleet enema prep is going to cause more pain and/or damage.  I discussed with Dr. Bradshaw.  She is okay with a bottle of magnesium citrate the night before at 8 PM.  I prescribed this to the patient's pharmacy.

## 2021-11-29 NOTE — TELEPHONE ENCOUNTER
In regards to  msg received from patient, sent the following message to Nicky RN:    Aleksey Saunders,    RE: Exam under anesthesia, anus, possible fistulotomy, possible seton w/ Dr. Bradshaw on 12/3/2021    Patient left  msg stating that since he has an open wound, he'd prefer to do a different prep instead of Fleet Enemas. He proposed doing Enemas?  I am not sure if he means a different type of enema? Anyway, is there a prep that he could do that would be better for his open wound?    Please let me know or him know directly.    Thank-you,    Samantha Albarran  Cindi-op Coordinator  [facility]  Direct Phone: [phone]

## 2021-11-29 NOTE — ANESTHESIA PREPROCEDURE EVALUATION
"Anesthesia Pre-Procedure Evaluation    Patient: Ish Dietz   MRN: [id number] : 1990        Preoperative Diagnosis: Anal fistula [K60.3]    Procedure : Procedure(s):  EXAM UNDER ANESTHESIA, ANUS  possible FISTULOTOMY, RECTUM  possible PLACEMENT, SETON STITCH  PAC EVALUATION       No past medical history on file.   Past Surgical History:   Procedure Laterality Date     C EACH ADD TOOTH EXTRACTION       WISDOM TOOTH EXTRACTION        No Known Allergies   Social History     Tobacco Use     Smoking status: Current Every Day Smoker     Packs/day: 1.50     Years: 10.00     Pack years: 15.00     Types: Cigarettes, Cigarettes     Smokeless tobacco: Never Used   Substance Use Topics     Alcohol use: Yes     Comment: Alcoholic Drinks/day: binge drink Friday nights 6-10drinks/week      Wt Readings from Last 1 Encounters:   10/27/21 147.6 kg (325 lb 6.4 oz)        Anesthesia Evaluation   Pt has had prior anesthetic.     History of anesthetic complications   \"slow to emerge\".    ROS/MED HX  ENT/Pulmonary:     (+) PRINCE risk factors, snores loudly, hypertension, obese, tobacco use, Current use,     Neurologic:     (+) seizures, last seizure: about 13 years ago, features: febrile seizure,     Cardiovascular:     (+) -----Taking blood thinners Previous cardiac testing   Echo: Date: Results:    Stress Test: Date: Results:    ECG Reviewed: Date:  Results:  NSR  Cath: Date: Results:   (-) murmur   METS/Exercise Tolerance: 3 - Able to walk 1-2 blocks without stopping Comment: Can walk multiple blocks without exertional symptoms   Hematologic:  - neg hematologic  ROS  (-) history of blood clots and history of blood transfusion   Musculoskeletal: Comment: Chronic back pain        GI/Hepatic: Comment: Anal fistula   (-) GERD   Renal/Genitourinary:  - neg Renal ROS     Endo:     (+) Obesity (BMI 44),     Psychiatric/Substance Use:     (+) psychiatric history depression     Infectious Disease:  - neg infectious disease ROS   "   Malignancy:  - neg malignancy ROS     Other:            Physical Exam    Airway        Mallampati: I   TM distance: > 3 FB   Neck ROM: full   Mouth opening: > 3 cm    Respiratory Devices and Support         Dental  no notable dental history         Cardiovascular          Rhythm and rate: regular and normal (-) no peripheral edema and no murmur    Pulmonary   pulmonary exam normal        breath sounds clear to auscultation           OUTSIDE LABS:  CBC:   Lab Results   Component Value Date    WBC 9.8 02/27/2015    HGB 16.0 02/27/2015    HCT 46.6 02/27/2015     02/27/2015     BMP:   Lab Results   Component Value Date     02/27/2015    POTASSIUM 3.6 02/27/2015    CHLORIDE 105 02/27/2015    CO2 26 02/27/2015    BUN 12 02/27/2015    CR 0.70 02/27/2015    GLC 87 02/27/2015     COAGS: No results found for: PTT, INR, FIBR  POC: No results found for: BGM, HCG, HCGS  HEPATIC:   Lab Results   Component Value Date    ALBUMIN 3.9 02/27/2015    PROTTOTAL 8.1 02/27/2015    ALT 63 02/27/2015    AST 33 02/27/2015    ALKPHOS 110 02/27/2015    BILITOTAL 0.4 02/27/2015     OTHER:   Lab Results   Component Value Date    SANDRA 9.4 02/27/2015    TSH 1.81 03/19/2015       Anesthesia Plan    ASA Status:  3   NPO Status:  NPO Appropriate    Anesthesia Type: General.     - Airway: ETT   Induction: Intravenous.   Maintenance: Inhalation.        Consents    Anesthesia Plan(s) and associated risks, benefits, and realistic alternatives discussed. Questions answered and patient/representative(s) expressed understanding.    - Discussed:     - Discussed with:  Patient      - Extended Intubation/Ventilatory Support Discussed: Yes.      - Patient is DNR/DNI Status: No    Use of blood products discussed: Yes.     - Discussed with: Patient.     Postoperative Care    Pain management: IV analgesics.   PONV prophylaxis: Ondansetron (or other 5HT-3), Dexamethasone or Solumedrol     Comments:              PAC Discussion and Assessment    ASA  Classification: 3  Case is suitable for: [place]  Anesthetic techniques and relevant risks discussed: MAC with GA as backup                  PAC Resident/NP Anesthesia Assessment: Ish Dietz is a 31 year old male scheduled for EUA anus, possible fistulotomy rectum, possible seton placement on 12/3/21 by Dr. Bradshaw in treatment of anal fistula.  PAC referral for risk assessment and optimization for anesthesia with comorbid conditions of Current tobacco use, obesity, depression, chronic back pain:      Pre-operative considerations:   1.  Cardiac:  Functional status- METS 3. Denies CP, orthopnea, edema. Endorses some longstanding MEZA when ascending stairs, but not with walking distances. Low risk surgery with 0.4% (RCRI #) risk of major adverse cardiac event.   ~teri reports h/o palpitations a few years ago- per chart review, he was seen in the ED and EKG, labs normal. Patient denies any recurrent symptoms since that time     2.  Pulm:  Airway feasible.  PRINCE risk: high  ~current tobacco use. Encouraged cessation and educated not to smoke DOS. Patient also smokes marijuana and reports he will not use within 24 hours of surgery.      3.  GI:  Risk of PONV score = 1.  If > 2, anti-emetic intervention recommended.   ~anal fistula with the above procedure planned      4. Endo: BMI 44      5. psych: depression     6. msk: chronic back pain     7. Anesthesia: patient reports an episode of being slow to wake after wisdom tooth extraction. Denies any issue with anesthesia with colonoscopy last year.     8. Access: patient reports h/o difficult IV access      VTE risk: 1.8%     Patient is optimized and is acceptable candidate for the proposed procedure.  No further diagnostic evaluation is needed.      Final plan per anesthesiologist on day of surgery.      **For further details of assessment, testing, and physical exam please see H and P completed on same date.         Tara Adam PA-C                                           Tara Adam PA-C

## 2021-11-30 LAB — SARS-COV-2 RNA RESP QL NAA+PROBE: NEGATIVE

## 2021-12-03 ENCOUNTER — ANESTHESIA (OUTPATIENT)
Dept: SURGERY | Facility: CLINIC | Age: 31
End: 2021-12-03
Payer: COMMERCIAL

## 2021-12-03 ENCOUNTER — HOSPITAL ENCOUNTER (OUTPATIENT)
Facility: CLINIC | Age: 31
Discharge: HOME | End: 2021-12-03
Attending: COLON & RECTAL SURGERY | Admitting: COLON & RECTAL SURGERY
Payer: COMMERCIAL

## 2021-12-03 VITALS
WEIGHT: 315 LBS | RESPIRATION RATE: 18 BRPM | HEIGHT: 72 IN | OXYGEN SATURATION: 98 % | HEART RATE: 78 BPM | DIASTOLIC BLOOD PRESSURE: 88 MMHG | SYSTOLIC BLOOD PRESSURE: 138 MMHG | TEMPERATURE: 97.7 F | BODY MASS INDEX: 42.66 KG/M2

## 2021-12-03 DIAGNOSIS — K60.2 ANAL FISSURE AND FISTULA: Primary | ICD-10-CM

## 2021-12-03 DIAGNOSIS — K60.30 ANAL FISSURE AND FISTULA: Primary | ICD-10-CM

## 2021-12-03 LAB — GLUCOSE BLDC GLUCOMTR-MCNC: 166 MG/DL (ref 70–99)

## 2021-12-03 PROCEDURE — 250N000025 HC SEVOFLURANE, PER MIN: Performed by: COLON & RECTAL SURGERY

## 2021-12-03 PROCEDURE — 272N000001 HC OR GENERAL SUPPLY STERILE: Performed by: COLON & RECTAL SURGERY

## 2021-12-03 PROCEDURE — 250N000009 HC RX 250: Performed by: COLON & RECTAL SURGERY

## 2021-12-03 PROCEDURE — 250N000009 HC RX 250: Performed by: ANESTHESIOLOGY

## 2021-12-03 PROCEDURE — 250N000013 HC RX MED GY IP 250 OP 250 PS 637: Performed by: COLON & RECTAL SURGERY

## 2021-12-03 PROCEDURE — 258N000003 HC RX IP 258 OP 636: Performed by: ANESTHESIOLOGY

## 2021-12-03 PROCEDURE — 370N000017 HC ANESTHESIA TECHNICAL FEE, PER MIN: Performed by: COLON & RECTAL SURGERY

## 2021-12-03 PROCEDURE — 250N000011 HC RX IP 250 OP 636: Performed by: COLON & RECTAL SURGERY

## 2021-12-03 PROCEDURE — 46275 REMOVE ANAL FIST INTER: CPT | Performed by: COLON & RECTAL SURGERY

## 2021-12-03 PROCEDURE — 710N000010 HC RECOVERY PHASE 1, LEVEL 2, PER MIN: Performed by: COLON & RECTAL SURGERY

## 2021-12-03 PROCEDURE — 82962 GLUCOSE BLOOD TEST: CPT

## 2021-12-03 PROCEDURE — 999N000141 HC STATISTIC PRE-PROCEDURE NURSING ASSESSMENT: Performed by: COLON & RECTAL SURGERY

## 2021-12-03 PROCEDURE — 250N000011 HC RX IP 250 OP 636: Performed by: ANESTHESIOLOGY

## 2021-12-03 PROCEDURE — 710N000012 HC RECOVERY PHASE 2, PER MINUTE: Performed by: COLON & RECTAL SURGERY

## 2021-12-03 PROCEDURE — 360N000075 HC SURGERY LEVEL 2, PER MIN: Performed by: COLON & RECTAL SURGERY

## 2021-12-03 PROCEDURE — 250N000013 HC RX MED GY IP 250 OP 250 PS 637: Performed by: ANESTHESIOLOGY

## 2021-12-03 RX ORDER — LIDOCAINE HYDROCHLORIDE 20 MG/ML
INJECTION, SOLUTION INFILTRATION; PERINEURAL PRN
Status: DISCONTINUED | OUTPATIENT
Start: 2021-12-03 | End: 2021-12-03

## 2021-12-03 RX ORDER — LIDOCAINE 40 MG/G
CREAM TOPICAL
Status: DISCONTINUED | OUTPATIENT
Start: 2021-12-03 | End: 2021-12-03 | Stop reason: HOSPADM

## 2021-12-03 RX ORDER — LIDOCAINE HYDROCHLORIDE AND EPINEPHRINE 10; 10 MG/ML; UG/ML
INJECTION, SOLUTION INFILTRATION; PERINEURAL PRN
Status: DISCONTINUED | OUTPATIENT
Start: 2021-12-03 | End: 2021-12-03 | Stop reason: HOSPADM

## 2021-12-03 RX ORDER — FENTANYL CITRATE 50 UG/ML
25 INJECTION, SOLUTION INTRAMUSCULAR; INTRAVENOUS
Status: DISCONTINUED | OUTPATIENT
Start: 2021-12-03 | End: 2021-12-03 | Stop reason: HOSPADM

## 2021-12-03 RX ORDER — ONDANSETRON 4 MG/1
4 TABLET, ORALLY DISINTEGRATING ORAL EVERY 30 MIN PRN
Status: DISCONTINUED | OUTPATIENT
Start: 2021-12-03 | End: 2021-12-03 | Stop reason: HOSPADM

## 2021-12-03 RX ORDER — SODIUM CHLORIDE, SODIUM LACTATE, POTASSIUM CHLORIDE, CALCIUM CHLORIDE 600; 310; 30; 20 MG/100ML; MG/100ML; MG/100ML; MG/100ML
INJECTION, SOLUTION INTRAVENOUS CONTINUOUS
Status: DISCONTINUED | OUTPATIENT
Start: 2021-12-03 | End: 2021-12-03 | Stop reason: HOSPADM

## 2021-12-03 RX ORDER — DEXMEDETOMIDINE HYDROCHLORIDE 4 UG/ML
0.2-1.2 INJECTION, SOLUTION INTRAVENOUS CONTINUOUS
Status: DISCONTINUED | OUTPATIENT
Start: 2021-12-03 | End: 2021-12-03 | Stop reason: HOSPADM

## 2021-12-03 RX ORDER — HYDROMORPHONE HCL IN WATER/PF 6 MG/30 ML
0.2 PATIENT CONTROLLED ANALGESIA SYRINGE INTRAVENOUS EVERY 5 MIN PRN
Status: DISCONTINUED | OUTPATIENT
Start: 2021-12-03 | End: 2021-12-03 | Stop reason: HOSPADM

## 2021-12-03 RX ORDER — OXYCODONE HYDROCHLORIDE 5 MG/1
5 TABLET ORAL EVERY 6 HOURS PRN
Qty: 6 TABLET | Refills: 0 | Status: SHIPPED | OUTPATIENT
Start: 2021-12-03 | End: 2021-12-06

## 2021-12-03 RX ORDER — ONDANSETRON 2 MG/ML
4 INJECTION INTRAMUSCULAR; INTRAVENOUS ONCE
Status: COMPLETED | OUTPATIENT
Start: 2021-12-03 | End: 2021-12-03

## 2021-12-03 RX ORDER — PROPOFOL 10 MG/ML
INJECTION, EMULSION INTRAVENOUS PRN
Status: DISCONTINUED | OUTPATIENT
Start: 2021-12-03 | End: 2021-12-03

## 2021-12-03 RX ORDER — FENTANYL CITRATE 50 UG/ML
INJECTION, SOLUTION INTRAMUSCULAR; INTRAVENOUS PRN
Status: DISCONTINUED | OUTPATIENT
Start: 2021-12-03 | End: 2021-12-03

## 2021-12-03 RX ORDER — GABAPENTIN 300 MG/1
600 CAPSULE ORAL
Status: COMPLETED | OUTPATIENT
Start: 2021-12-03 | End: 2021-12-03

## 2021-12-03 RX ORDER — FENTANYL CITRATE 50 UG/ML
25 INJECTION, SOLUTION INTRAMUSCULAR; INTRAVENOUS EVERY 5 MIN PRN
Status: DISCONTINUED | OUTPATIENT
Start: 2021-12-03 | End: 2021-12-03 | Stop reason: HOSPADM

## 2021-12-03 RX ORDER — MEPERIDINE HYDROCHLORIDE 25 MG/ML
12.5 INJECTION INTRAMUSCULAR; INTRAVENOUS; SUBCUTANEOUS
Status: DISCONTINUED | OUTPATIENT
Start: 2021-12-03 | End: 2021-12-03 | Stop reason: HOSPADM

## 2021-12-03 RX ORDER — ALBUTEROL SULFATE 90 UG/1
AEROSOL, METERED RESPIRATORY (INHALATION) PRN
Status: DISCONTINUED | OUTPATIENT
Start: 2021-12-03 | End: 2021-12-03

## 2021-12-03 RX ORDER — DEXAMETHASONE SODIUM PHOSPHATE 4 MG/ML
INJECTION, SOLUTION INTRA-ARTICULAR; INTRALESIONAL; INTRAMUSCULAR; INTRAVENOUS; SOFT TISSUE PRN
Status: DISCONTINUED | OUTPATIENT
Start: 2021-12-03 | End: 2021-12-03

## 2021-12-03 RX ORDER — BUPIVACAINE HYDROCHLORIDE 2.5 MG/ML
INJECTION, SOLUTION EPIDURAL; INFILTRATION; INTRACAUDAL PRN
Status: DISCONTINUED | OUTPATIENT
Start: 2021-12-03 | End: 2021-12-03 | Stop reason: HOSPADM

## 2021-12-03 RX ORDER — ONDANSETRON 2 MG/ML
4 INJECTION INTRAMUSCULAR; INTRAVENOUS EVERY 30 MIN PRN
Status: DISCONTINUED | OUTPATIENT
Start: 2021-12-03 | End: 2021-12-03 | Stop reason: HOSPADM

## 2021-12-03 RX ORDER — OXYCODONE HYDROCHLORIDE 5 MG/1
5 TABLET ORAL EVERY 4 HOURS PRN
Status: DISCONTINUED | OUTPATIENT
Start: 2021-12-03 | End: 2021-12-03 | Stop reason: HOSPADM

## 2021-12-03 RX ADMIN — GABAPENTIN 600 MG: 300 CAPSULE ORAL at 08:29

## 2021-12-03 RX ADMIN — ONDANSETRON 4 MG: 2 INJECTION INTRAMUSCULAR; INTRAVENOUS at 10:09

## 2021-12-03 RX ADMIN — SUGAMMADEX 400 MG: 100 INJECTION, SOLUTION INTRAVENOUS at 10:16

## 2021-12-03 RX ADMIN — SODIUM CHLORIDE, POTASSIUM CHLORIDE, SODIUM LACTATE AND CALCIUM CHLORIDE: 600; 310; 30; 20 INJECTION, SOLUTION INTRAVENOUS at 09:33

## 2021-12-03 RX ADMIN — FENTANYL CITRATE 150 MCG: 50 INJECTION, SOLUTION INTRAMUSCULAR; INTRAVENOUS at 09:40

## 2021-12-03 RX ADMIN — ROCURONIUM BROMIDE 40 MG: 50 INJECTION, SOLUTION INTRAVENOUS at 09:53

## 2021-12-03 RX ADMIN — LIDOCAINE HYDROCHLORIDE 100 MG: 20 INJECTION, SOLUTION INFILTRATION; PERINEURAL at 09:40

## 2021-12-03 RX ADMIN — MIDAZOLAM 2 MG: 1 INJECTION INTRAMUSCULAR; INTRAVENOUS at 09:33

## 2021-12-03 RX ADMIN — Medication 100 MG: at 09:40

## 2021-12-03 RX ADMIN — DEXAMETHASONE SODIUM PHOSPHATE 8 MG: 4 INJECTION, SOLUTION INTRA-ARTICULAR; INTRALESIONAL; INTRAMUSCULAR; INTRAVENOUS; SOFT TISSUE at 09:59

## 2021-12-03 RX ADMIN — FENTANYL CITRATE 25 MCG: 50 INJECTION INTRAMUSCULAR; INTRAVENOUS at 10:36

## 2021-12-03 RX ADMIN — PROPOFOL 200 MG: 10 INJECTION, EMULSION INTRAVENOUS at 09:40

## 2021-12-03 RX ADMIN — ALBUTEROL SULFATE 10 PUFF: 108 INHALANT RESPIRATORY (INHALATION) at 09:52

## 2021-12-03 RX ADMIN — ROCURONIUM BROMIDE 10 MG: 50 INJECTION, SOLUTION INTRAVENOUS at 09:40

## 2021-12-03 ASSESSMENT — MIFFLIN-ST. JEOR: SCORE: 2473

## 2021-12-03 NOTE — PROGRESS NOTES
RN informed MDA Dr Cordero regarding pt's blood pressure of 158/110. MDA is okay with this and no interventions at this time.   Cris Newell RN

## 2021-12-03 NOTE — ANESTHESIA CARE TRANSFER NOTE
Patient: Ish Dietz    Procedure: Procedure(s):  EXAM UNDER ANESTHESIA, ANUS  completion of FISTULOTOMY, RECTUM       Diagnosis: Anal fistula [K60.3]  Diagnosis Additional Information: No value filed.    Anesthesia Type:   General     Note:    Oropharynx: oropharynx clear of all foreign objects and spontaneously breathing  Level of Consciousness: awake  Oxygen Supplementation: face mask    Independent Airway: airway patency satisfactory and stable  Dentition: dentition unchanged  Vital Signs Stable: post-procedure vital signs reviewed and stable  Report to RN Given: handoff report given  Patient transferred to: PACU  Comments: VSS, report given to RN.    Handoff Report: Identifed the Patient, Identified the Reponsible Provider, Reviewed the pertinent medical history, Discussed the surgical course, Reviewed Intra-OP anesthesia mangement and issues during anesthesia, Set expectations for post-procedure period and Allowed opportunity for questions and acknowledgement of understanding      Vitals:  Vitals Value Taken Time   /69    Temp     Pulse 96 12/03/21 1024   Resp 14 12/03/21 1024   SpO2 98 % 12/03/21 1024   Vitals shown include unvalidated device data.    Electronically Signed By: LOBITO Dooley CRNA  December 3, 2021  10:25 AM

## 2021-12-03 NOTE — ANESTHESIA POSTPROCEDURE EVALUATION
Patient: Ish Dietz    Procedure: Procedure(s):  EXAM UNDER ANESTHESIA, ANUS  completion of FISTULOTOMY, RECTUM       Diagnosis:Anal fistula [K60.3]  Diagnosis Additional Information: No value filed.    Anesthesia Type:  General    Note:  Disposition: Outpatient   Postop Pain Control: Uneventful            Sign Out: Well controlled pain   PONV: No   Neuro/Psych: Uneventful            Sign Out: Acceptable/Baseline neuro status   Airway/Respiratory: Uneventful            Sign Out: Acceptable/Baseline resp. status   CV/Hemodynamics: Uneventful            Sign Out: Acceptable CV status; No obvious hypovolemia; No obvious fluid overload   Other NRE: NONE   DID A NON-ROUTINE EVENT OCCUR? No    Event details/Postop Comments:  Hemodynamically stable, denies N/V, well controlled pain.             Last vitals:  Vitals Value Taken Time   /66 12/03/21 1145   Temp 36.5  C (97.7  F) 12/03/21 1115   Pulse 63 12/03/21 1153   Resp 10 12/03/21 1153   SpO2 97 % 12/03/21 1153   Vitals shown include unvalidated device data.    Electronically Signed By: Sabino Bateman MD  December 3, 2021  11:54 AM

## 2021-12-03 NOTE — OP NOTE
SURGEON: Ashley Menjivar MD     ASSISTANT: None    PREOPERATIVE DIAGNOSIS: Anorectal fistula associated with a fissure. Morbid obesity.    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 Morbid obesity.    PROCEDURE: Examination under anesthesia, completion fistulotomy.    INDICATIONS: Ish Dietz is a 31 year old male who presents with a anorectal fistula. Due to morbid obesity, we are performing this in the operating room under general anesthesia. The risks and benefits of surgery were thoroughly discussed with the patient and he agreed to proceed.     DESCRIPTION OF PROCEDURE: The patient was brought to the operating room, general anesthesia was performed, and he was placed prone on the operating room table.We prepped and draped the area in the usual sterile fashion. We began the procedure with a timeout and performed an anal block using a mixture 50/50 of bupivacaine and lidocaine with epinephrine. A total of 10 mL were infused and following this, we performed anoscopy which demonstrated normal anal mucosa, minimal hemorrhoids and the stigmata of a posterior external orifice and fissure associated with the fistula. The lacrimal probe demonstrated a low intersphincteric fistula. A completion fistulotomy was performed with good hemostasis.  At the end the case, all instruments and sponge counts were correct x2. The patient was emerged from anesthesia and taken to postoperative anesthesia care unit in good condition.     SPECIMEN: None.     ESTIMATED BLOOD LOSS: Minimal.     DRAINS: None.     URINE OUTPUT: Not measured.     ASHLEY MENJIVAR MD   Colon and Rectal Surgery Staff  [facility]

## 2021-12-03 NOTE — ANESTHESIA PROCEDURE NOTES
Airway       Patient location during procedure: OR       Procedure Start/Stop Times: 12/3/2021 9:52 AM  Staff -        Other Anesthesia Staff: Jam Mendoza       Performed By: SRNA  Consent for Airway        Urgency: elective  Indications and Patient Condition       Indications for airway management: jazmín-procedural       Induction type:intravenous       Mask difficulty assessment: 1 - vent by mask    Final Airway Details       Final airway type: endotracheal airway       Successful airway: ETT - single  Endotracheal Airway Details        ETT size (mm): 8.0       Cuffed: yes       Successful intubation technique: video laryngoscopy       VL Blade Size: MAC D Blade       Grade View of Cords: 1       Adjucts: stylet       Position: Right       Measured from: lips       Secured at (cm): 23       Bite block used: Soft    Post intubation assessment        Placement verified by: capnometry, equal breath sounds and chest rise        Number of attempts at approach: 1       Number of other approaches attempted: 0       Secured with: pink tape       Ease of procedure: easy       Dentition: Intact and Unchanged

## 2021-12-03 NOTE — DISCHARGE INSTRUCTIONS
Anorectal Surgery Instructions      What can I expect after anorectal surgery?  Most anorectal procedures are done as outpatient surgery, and you go home the same day as the procedure. A few surgical procedures will require that you stay in the hospital for about one to three days. No matter where the procedure is done or how long or short it takes, these recommendations will help you heal and feel more comfortable.    Medicines:  The anal area is very sensitive; you can expect to have some pain for up to 2-4 weeks after the procedure. Your doctor will give you a prescription for one or more pain medications. In general, there are two types of medicines that can provide relief.      Non-steroidal anti-inflammatory drugs (NSAIDS). This class of drugs includes ibuprophen and naproxen. Your doctor may give you a prescription for these, or you can buy the identical drugs in smaller size tablets, over the counter. Brand names include: Advil or Aleve. In general, it is best to take these drugs on a regular basis following your surgery. Some patients cannot take these drugs, either because they cause stomach upset or if the individual has a history of ulcers or gastritis. The drugs are best taken with food. The maximum dose of ibuprofen is 800 milligrams 3 times per dayOR 600 milligrams 4 times per day. The maximum does of naproxen is 500 milligrams 2 times per day. Your doctor may give you a prescription for one or the other of these drugs, or you can buy them at your drugstore.    Narcotic pain relievers. These include Vicodin, Percocet, and Tylenol number 3. These are all prescription medications. These should be used as prescribed and as needed. Because these drugs have side effects (including constipation), you should reduce your use of these medications as tolerated as your pain improves.    In general, the best strategy is to take (if you are able to tolerate it) an NSAID medication on a regular basis until your  pain has largely gone away. You can take the narcotic pain medicine in addition to the NSAID medicine. Most patients use the full dose of the NSAID medication and narcotic pain reliever for the first few days after their operation. As your pain begins to lessen, you should cut back on your narcotic use while continuing to take your regular NSAID medication.    Some patients find it easiest to transition away from narcotic drugs by also taking acetaminophen (Tylenol). However, it is important to realize that most narcotic pain relievers also have acetaminophen, and excessive doses of acetaminophen can be dangerous.Accordingly, you can substitute 1000 milligrams of acetaminophen (two Extra Strength Tylenol or similar generic) for any given dose of narcotic, but acetominophen should not be taken in addition to a full narcotic dose that contains acetaminophen. The maximum acceptable dose of acetaminophen is 4000 milligrams.    Refilling prescriptions. If you need additional pain medication, please call the triage nurse at [phone] during normal business hours (8 a.m. to 4 p.m., Monday though Friday) or have your pharmacy fax a refill request to [phone]. If you call after hours or on the weekends, the doctor on call may not know you personally and may not renew narcotic pain medication by phone. Call your primary care provider for all other medication refills.     Bowel function and Perineal care:  Bowel movements can be very painful. It is important to have regular bowel movements at least every other day and to keep your stool soft. Your doctor may tell you to take a stool softener, such as Colace, once or twice a day. Try to avoid constipation and/or diarrhea as this can make the pain and bleeding worse. A high fiber diet, including at least four servings of fruits or vegetables daily, will help to keep your bowel movements regular and soft. If you have trouble getting enough fiber in your daily diet, a fiber  supplement can be considered, including Metamucil, Benefiber, or Citrucel, and can be bought at your local grocery store or pharmacy. It is important to drink six to eight glasses of water or juice everyday when using fiber products.    You can expect to have some bleeding after bowel movements, but it should stop soon after you wipe. Use a wet cloth or perianal pad (Tucks or Preparation H pads) to gently wipe the area after each bowel movement. Do not rub the anal area or use a lot of pressure. Using a spray bottle filled with warm water helps loosen any remaining stool. Blot gently with a soft dry cloth or tissue paper.    If possible, take a tub bath immediately after each bowel movement. Baths should be take at least 3 times daily for the first week to 10 days following your procedure. You should soak in the tub for 10 to 15 minutes each time with water as warm as you can tolerate. Even after you go back to work, it is a good idea to sit in the tub in the morning, after returning from work, and again in the evening before bedtime.    Constipation will cause you to strain when you have a bowel movement. The hard stool will be difficult to pass, will increase pain and bleeding, and will slow down healing. If you have not had a bowel movement within 2 days, take 2 teaspoons of Milk of Magnesia in the morning. If there are no results, repeat this. Stop taking Milk of Magnesia or other laxatives if you begin to have diarrhea.    Diarrhea can occur if too much laxative is taken or for several other reasons. If you have 3 or more loose, watery stools in a 24-hour period, stop taking laxatives. Continue to eat a high fiber diet and to take bulk-forming agents such as Metamucil, Benefiber, or Citrucel. You may take Immodium as directed on the package but please call the triage nurse, [phone], if this was not discussed with you surgeon preoperatively.    If you are still having diarrhea or constipation after you have  tried these recommendations, please call the triage nurse line, [phone].    Bleeding/Infection:  Infection around the anal opening is not very common. The anal area has excellent blood supply, which helps the area to heal. Bloody discharge after bowel movements is normal and may last 2 to 4 weeks after your surgery. However, if you bleed between bowel movements and cannot get it to stop, call the triage nurse immediately [phone].    Activity:  After your procedure, there are no restrictions on your activity except restrictions surrounding being on narcotics and in pain, such as no heavy machine operating or driving. You may walk, climb stairs, ride in a car, and sit as tolerated. It is helpful to avoid sitting in one position for long periods (2 or more hours). After some surgeries, you may be told not to perform any lifting (more than 10 pounds) for several weeks after surgery.    When do I need to call the doctor or triage nurse?  If you experience any of the problems listed here, call our triage nurse during business hours ([phone]). The nurse will help you with your problem or have the doctor call you. After hours and on weekends, please call the main hospital number ([phone]) and ask for the colon and rectal surgery person on call. Some is available to help you 24 hours a day, seven days a week.    Fever greater than 101 degrees    Chills    Foul-smelling drainage    Nausea and vomiting    Diarrhea - greater than 3 water stools in 24 hours    Constipation - no bowel movement after 3 days    Severe bleeding that does not stop soon after a bowel movement    Problems with the incision, including increased pain, swelling, or redness        [facility]  Phone: [phone]

## 2021-12-08 ENCOUNTER — TELEPHONE (OUTPATIENT)
Dept: SURGERY | Facility: CLINIC | Age: 31
End: 2021-12-08
Payer: COMMERCIAL

## 2021-12-08 NOTE — TELEPHONE ENCOUNTER
[facility] Call Center    Phone Message    May a detailed message be left on voicemail: yes     Reason for Call: Other:      Ish is calling in asking to speak with a nurse regarding some itching at his incision site.  He states no real pain and the bleeding stopped by yesterday but he is very itchy now.  Please call him back to discuss.           Action Taken: Message routed to:  [facility] ([facility]    Travel Screening: Not Applicable

## 2021-12-08 NOTE — TELEPHONE ENCOUNTER
Ish is status post fistulotomy.  He states he is healing well.  Denies any rectal pain or bleeding.  He states that the incision is very itchy especially after the shower.  I told him this is typically from excess moisture to the area.  He should tuck a gauze in between his butt cheeks to help wick away moisture.  If he is still having excess itching he should also add in a oral fiber supplement to help wick away moisture.  He states understanding.  He has a follow-up appointment with us on Friday so will be able to take a look at the incision at that point.

## 2021-12-10 ENCOUNTER — OFFICE VISIT (OUTPATIENT)
Dept: SURGERY | Facility: CLINIC | Age: 31
End: 2021-12-10
Payer: COMMERCIAL

## 2021-12-10 VITALS
DIASTOLIC BLOOD PRESSURE: 78 MMHG | OXYGEN SATURATION: 98 % | SYSTOLIC BLOOD PRESSURE: 134 MMHG | BODY MASS INDEX: 42.66 KG/M2 | HEART RATE: 76 BPM | WEIGHT: 315 LBS | HEIGHT: 72 IN

## 2021-12-10 DIAGNOSIS — Z09 FOLLOW-UP EXAMINATION AFTER COLORECTAL SURGERY: Primary | ICD-10-CM

## 2021-12-10 DIAGNOSIS — K60.30 ANAL FISTULA: ICD-10-CM

## 2021-12-10 PROCEDURE — 99024 POSTOP FOLLOW-UP VISIT: CPT | Performed by: NURSE PRACTITIONER

## 2021-12-10 ASSESSMENT — MIFFLIN-ST. JEOR: SCORE: 2493.48

## 2021-12-10 ASSESSMENT — PAIN SCALES - GENERAL: PAINLEVEL: MILD PAIN (2)

## 2021-12-10 NOTE — NURSING NOTE
Chief Complaint   Patient presents with     Post-op Visit       Vitals:    12/10/21 1106   BP: 134/78   BP Location: Left arm   Patient Position: Sitting   Cuff Size: Adult Large   Pulse: 76   SpO2: 98%   Weight: (!) 333 lb   Height: 6'       Body mass index is 45.16 kg/m .    Zuleyka Garcia CMA

## 2021-12-10 NOTE — LETTER
12/10/2021       RE: Ish Dietz  [address] [place] [address]     Dear Colleague,    Thank you for referring your patient, Ish Dietz, to the [facility] [place] [facility]. Please see a copy of my visit note below.    Colon and Rectal Surgery Postoperative Clinic Note    RE: Ish Dietz  : 1990  ZOEY: 12/10/2021    Ish Dietz is a very pleasant 31 year old male with anorectal fistula associated with a fissure who is now status post examination under anesthesia with completion fistulotomy on 12/3/21 with Dr. Bradshaw.    Interval history: Ish has been doing well. He has minimal discomfort. Drainage and bleeding have stopped. He is not needing any gauze for drainage. Bowel movements are normal.    Physical Examination: Exam was chaperoned by Zuleyka Garcia MA   /78 (BP Location: Left arm, Patient Position: Sitting, Cuff Size: Adult Large)   Pulse 76   Ht 6'   Wt (!) 333 lb   SpO2 98%   BMI 45.16 kg/m    General: alert, oriented, in no acute distress, sitting comfortably  HEENT: mucous membranes moist  Perianal external examination:  Surgical site remains open in the posterior position with good granulation tissue. No erythema and no purulent drainage.    Digital rectal examination: Was deferred.    Anoscopy: Was deferred.    Assessment/Plan:  31 year old male status post examination under anesthesia with completion fistulotomy on 12/3/21 with Dr. Bradshaw. He is healing well. Surgical site without signs of infection. No pain. No incontinence. He can follow up in 3 week for any symptoms and can otherwise follow up as needed. Patient's questions were answered to his stated satisfaction and he is in agreement with this plan.      Medical history:  No past medical history on file.    Surgical history:  Past Surgical History:   Procedure Laterality Date     C EACH ADD TOOTH EXTRACTION        EXAM UNDER ANESTHESIA ANUS N/A 12/3/2021    Procedure: EXAM UNDER ANESTHESIA, ANUS;  Surgeon: Mag Bradshaw MD;  Location: [facility] OR     FISTULOTOMY RECTUM N/A 12/3/2021    Procedure: completion of FISTULOTOMY, RECTUM;  Surgeon: Mag Bradshaw MD;  Location: [facility] OR     WISDOM TOOTH EXTRACTION         Problem list:  Patient Active Problem List    Diagnosis Date Noted     Morbid obesity (H) 08/10/2021     Priority: Medium     Adenomatous polyp of colon 01/19/2021     Priority: Medium     Family history of colon cancer 06/26/2018     Priority: Medium     Formatting of this note might be different from the original.  2016: Colonoscopy showed benign hyperplastic polyps; plan to repeat colonoscopy in 3-5 years based on extensive family history.       Chronic low back pain 03/04/2016     Priority: Medium     Tobacco use 03/04/2016     Priority: Medium     Depression 03/19/2015     Priority: Medium     Obesity 03/19/2015     Priority: Medium     Tobacco abuse 03/19/2015     Priority: Medium       Medications:  Current Outpatient Medications   Medication Sig Dispense Refill     ASPIRIN PO Take 81 mg by mouth as needed for moderate pain       DiphenhydrAMINE HCl (BENADRYL PO) Take 50 mg by mouth nightly as needed        fluticasone (FLONASE) 50 MCG/ACT nasal spray Spray 1 spray into both nostrils nightly as needed for rhinitis or allergies         Allergies:  No Known Allergies    Family history:  Family History   Problem Relation Age of Onset     Thyroid Disease Mother         hyperthyroid     Colon Cancer Sister 32     Thyroid Disease Maternal Grandmother         hyperthyroid     Melanoma Maternal Grandfather      Arthritis Paternal Grandmother      Cerebrovascular Disease Paternal Grandmother         early 60's     Multiple Sclerosis Maternal Aunt      Anesthesia Reaction No family hx of      Deep Vein Thrombosis (DVT) No family hx of        Social history:  Social History     Tobacco Use      Smoking status: Current Every Day Smoker     Packs/day: 1.50     Years: 10.00     Pack years: 15.00     Types: Cigarettes, Cigarettes     Smokeless tobacco: Never Used   Substance Use Topics     Alcohol use: Yes     Comment: non efor the past month     Marital status: single.    Nursing Notes:   Zuleyka Garcia  12/10/2021 11:11 AM  Signed  Chief Complaint   Patient presents with     Post-op Visit       Vitals:    12/10/21 1106   BP: 134/78   BP Location: Left arm   Patient Position: Sitting   Cuff Size: Adult Large   Pulse: 76   SpO2: 98%   Weight: (!) 333 lb   Height: 6'       Body mass index is 45.16 kg/m .    Zuleyka Garcia CMA         15 minutes spent on the date of the encounter doing chart review, history and exam, documentation and further activities as noted above.   This is a postop visit.    LOBITO Boyce, NP-C  Colon and Rectal Surgery  [facility]    This note was created using speech recognition software and may contain unintended word substitutions.

## 2021-12-10 NOTE — PROGRESS NOTES
Colon and Rectal Surgery Postoperative Clinic Note    RE: Ish Dietz  : 1990  ZOEY: 12/10/2021    Ish Dietz is a very pleasant 31 year old male with anorectal fistula associated with a fissure who is now status post examination under anesthesia with completion fistulotomy on 12/3/21 with Dr. Bradshaw.    Interval history: Ish has been doing well. He has minimal discomfort. Drainage and bleeding have stopped. He is not needing any gauze for drainage. Bowel movements are normal.    Physical Examination: Exam was chaperoned by Zuleyka Garcia MA   /78 (BP Location: Left arm, Patient Position: Sitting, Cuff Size: Adult Large)   Pulse 76   Ht 6'   Wt (!) 333 lb   SpO2 98%   BMI 45.16 kg/m    General: alert, oriented, in no acute distress, sitting comfortably  HEENT: mucous membranes moist  Perianal external examination:  Surgical site remains open in the posterior position with good granulation tissue. No erythema and no purulent drainage.    Digital rectal examination: Was deferred.    Anoscopy: Was deferred.    Assessment/Plan:  31 year old male status post examination under anesthesia with completion fistulotomy on 12/3/21 with Dr. Bradshaw. He is healing well. Surgical site without signs of infection. No pain. No incontinence. He can follow up in 3 week for any symptoms and can otherwise follow up as needed. Patient's questions were answered to his stated satisfaction and he is in agreement with this plan.      Medical history:  No past medical history on file.    Surgical history:  Past Surgical History:   Procedure Laterality Date     C EACH ADD TOOTH EXTRACTION       EXAM UNDER ANESTHESIA ANUS N/A 12/3/2021    Procedure: EXAM UNDER ANESTHESIA, ANUS;  Surgeon: Mag Bradshaw MD;  Location: [facility] OR     FISTULOTOMY RECTUM N/A 12/3/2021    Procedure: completion of FISTULOTOMY, RECTUM;  Surgeon: Mag Bradshaw MD;  Location: [facility] OR     WISDOM TOOTH EXTRACTION          Problem list:  Patient Active Problem List    Diagnosis Date Noted     Morbid obesity (H) 08/10/2021     Priority: Medium     Adenomatous polyp of colon 01/19/2021     Priority: Medium     Family history of colon cancer 06/26/2018     Priority: Medium     Formatting of this note might be different from the original.  2016: Colonoscopy showed benign hyperplastic polyps; plan to repeat colonoscopy in 3-5 years based on extensive family history.       Chronic low back pain 03/04/2016     Priority: Medium     Tobacco use 03/04/2016     Priority: Medium     Depression 03/19/2015     Priority: Medium     Obesity 03/19/2015     Priority: Medium     Tobacco abuse 03/19/2015     Priority: Medium       Medications:  Current Outpatient Medications   Medication Sig Dispense Refill     ASPIRIN PO Take 81 mg by mouth as needed for moderate pain       DiphenhydrAMINE HCl (BENADRYL PO) Take 50 mg by mouth nightly as needed        fluticasone (FLONASE) 50 MCG/ACT nasal spray Spray 1 spray into both nostrils nightly as needed for rhinitis or allergies         Allergies:  No Known Allergies    Family history:  Family History   Problem Relation Age of Onset     Thyroid Disease Mother         hyperthyroid     Colon Cancer Sister 32     Thyroid Disease Maternal Grandmother         hyperthyroid     Melanoma Maternal Grandfather      Arthritis Paternal Grandmother      Cerebrovascular Disease Paternal Grandmother         early 60's     Multiple Sclerosis Maternal Aunt      Anesthesia Reaction No family hx of      Deep Vein Thrombosis (DVT) No family hx of        Social history:  Social History     Tobacco Use     Smoking status: Current Every Day Smoker     Packs/day: 1.50     Years: 10.00     Pack years: 15.00     Types: Cigarettes, Cigarettes     Smokeless tobacco: Never Used   Substance Use Topics     Alcohol use: Yes     Comment: non efor the past month     Marital status: single.    Nursing Notes:   Zuleyka Garcia  12/10/2021  11:11 AM  Signed  Chief Complaint   Patient presents with     Post-op Visit       Vitals:    12/10/21 1106   BP: 134/78   BP Location: Left arm   Patient Position: Sitting   Cuff Size: Adult Large   Pulse: 76   SpO2: 98%   Weight: (!) 333 lb   Height: 6'       Body mass index is 45.16 kg/m .    Zuleyka Garcia CMA         15 minutes spent on the date of the encounter doing chart review, history and exam, documentation and further activities as noted above.   This is a postop visit.    LOBITO Boyce, NP-C  Colon and Rectal Surgery  [facility]    This note was created using speech recognition software and may contain unintended word substitutions.

## (undated) DEVICE — SYR 10ML FINGER CONTROL W/O NDL 309695

## (undated) DEVICE — SU SILK 3-0 TIE 12X30" A304H

## (undated) DEVICE — SU SILK 2-0 TIE 12X30" A305H

## (undated) DEVICE — STRAP UNIVERSAL POSITIONING 2-PIECE 4X47X76" 91-287

## (undated) DEVICE — COVER CAMERA IN-LIGHT DISP LT-C02

## (undated) DEVICE — PANTIES MESH LG/XLG 2PK 706M2

## (undated) DEVICE — PAD CHUX UNDERPAD 23X24" 7136

## (undated) DEVICE — LINEN TOWEL PACK X6 WHITE 5487

## (undated) DEVICE — SUCTION MANIFOLD NEPTUNE 2 SYS 4 PORT 0702-020-000

## (undated) DEVICE — DECANTER VIAL 2006S

## (undated) DEVICE — PREP POVIDONE IODINE SOLUTION 10% 4OZ

## (undated) DEVICE — ESU PENCIL SMOKE EVAC W/ROCKER SWITCH 0703-047-000

## (undated) DEVICE — DRSG GAUZE 4X4" TRAY 6939

## (undated) DEVICE — PACK RECTAL UMMC

## (undated) DEVICE — SU SILK 0 TIE 6X30" A306H

## (undated) RX ORDER — LIDOCAINE HYDROCHLORIDE AND EPINEPHRINE 10; 10 MG/ML; UG/ML
INJECTION, SOLUTION INFILTRATION; PERINEURAL
Status: DISPENSED
Start: 2021-12-03

## (undated) RX ORDER — GABAPENTIN 300 MG/1
CAPSULE ORAL
Status: DISPENSED
Start: 2021-12-03

## (undated) RX ORDER — BUPIVACAINE HYDROCHLORIDE 2.5 MG/ML
INJECTION, SOLUTION EPIDURAL; INFILTRATION; INTRACAUDAL
Status: DISPENSED
Start: 2021-12-03

## (undated) RX ORDER — FENTANYL CITRATE 50 UG/ML
INJECTION, SOLUTION INTRAMUSCULAR; INTRAVENOUS
Status: DISPENSED
Start: 2021-12-03

## (undated) RX ORDER — PROPOFOL 10 MG/ML
INJECTION, EMULSION INTRAVENOUS
Status: DISPENSED
Start: 2021-12-03